# Patient Record
(demographics unavailable — no encounter records)

---

## 2025-01-27 NOTE — PHYSICAL EXAM
[FreeTextEntry1] : General: Constitutional:  Sitting comfortably in NAD. Psychiatric: well-groomed, appropriate affect, insight/judgment intact  Cognitive: Orientation, language, memory and knowledge screens intact. No expressive or receptive errors.  Cranial Nerves: VII: Face appears symmetric   Motor: Power: no pronator drift.

## 2025-01-27 NOTE — HISTORY OF PRESENT ILLNESS
[Home] : at home, [unfilled] , at the time of the visit. [Medical Office: ([facility] [place])___] : at the medical office located in  [Verbal consent obtained from patient] : the patient, [unfilled] [FreeTextEntry1] :  Trevon is a 48 yo with a question of ADD or ADHD.  He was encouraged by his wife, his therapist and through his reading to evaluate.  He is currently unemployed, reports having a bumpy life from age of 19.  He developed mental health issues at that time and has been facing depression since then. Heavy MJ user.  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  Has been trying to cut down on MJ and exercising more.  Grew up with mom, dad and younger brother 3-4 years younger in [place], and felt it was a nice upbringing. Attending [employer] [place], majored in theatre.  Graduated and felt he did well in that [place], but was smoking MJ daily. Had dreams of doing it professionally, but never executed on making an effort.   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 Felt that he hated work.  He executed on this visit - but found it agonizing to focus to get through the process.  It hangs over his head and can last for months.  Too many steps and seems complicated. Has trouble focusing on any particular task.  Finds himself incredibly inefficient. Procrastinates, disorganized and finds it exhausting and frustrating.  In grade school, he was one of the smarter kids in class. Moved to during 8th grade.  He was still achieving A's. High school his grades started to fall off.  Had trouble with completing homework - B's and C's in math and science, A's in humanities. Had started MJ in high school as sophomore or shabbir and was more regularly used as a senior.  Realizes his mood is better and he is more likely to eat healthy and exercise without it. He can skip for a few weeks, then may restart for a week or so.  His father may have similar traits of starting many projects and not finishing anything.

## 2025-01-27 NOTE — DISCUSSION/SUMMARY
[FreeTextEntry1] : Impression: 1) question of ADHD - fits the picture generally. 2) MJ overuse.  Plan: 1) send ASRS v1.1 2) will need in person visit with me or April 3) will speak to neuropsych about availability.

## 2025-02-19 NOTE — PHYSICAL EXAM
[FreeTextEntry1] : General: Constitutional: Sitting comfortably in NAD. Psychiatric: well-groomed, appropriate affect Ears, Nose, Throat: no abnormalities, mucus membranes moist Neck: supple Extremities: no edema, clubbing or cyanosis Skin: no rash or neuro-cutaneous signs  Cognitive: Orientation, language, memory and knowledge screens intact.  Cranial Nerves: II: BRENNEN. III/IV/VI: EOM Full. VII: Face appears symmetric. VIII: Normal to screening. IX/X: Normal phonation. XI: Trapezius Symmetric. XII: Tongue midline.  Motor: no tremor  Power: No pronator drift.  Normal gait.

## 2025-02-19 NOTE — HISTORY OF PRESENT ILLNESS
[FreeTextEntry1] : 2/19/25 HPI: Trevon is a 47 year old male presenting for a follow up visit for attention issues. Last seen by Dr. Layne via televisit.  Showed ASRS results - scored 5/6 on part A and 10/12 on part B.  Interested in starting treatment, and also formal neuropsych testing down the line.   History of HTN on Losartan. No palpitations.  --------------------------- Last seen 1/27/25: He is currently unemployed, reports having a bumpy life from age of 19. He developed mental health issues at that time and has been facing depression since then. Heavy MJ user. 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  Has been trying to cut down on MJ and exercising more.  Grew up with mom, dad and younger brother 3-4 years younger in [place], and felt it was a nice upbringing. Attending [employer], majored in theatre. Graduated and felt he did well in that [place], but was smoking MJ daily. Had dreams of doing it professionally, but never executed on making an effort. 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 Felt that he hated work.  He executed on this visit - but found it agonizing to focus to get through the process. It hangs over his head and can last for months. Too many steps and seems complicated. Has trouble focusing on any particular task. Finds himself incredibly inefficient. Procrastinates, disorganized and finds it exhausting and frustrating.  In grade school, he was one of the smarter kids in class. Moved to during 8th grade. He was still achieving A's. High school his grades started to fall off. Had trouble with completing homework - B's and C's in math and science, A's in humanities. Had started MJ in high school as sophomore or shabbir and was more regularly used as a senior.  Realizes his mood is better and he is more likely to eat healthy and exercise without it. He can skip for a few weeks, then may restart for a week or so.  His father may have similar traits of starting many projects and not finishing anything.

## 2025-02-19 NOTE — DISCUSSION/SUMMARY
[FreeTextEntry1] : Impression: 1) question of ADHD - fits the picture generally. scored 5/6 on ASRS part A, and 10/12 on part B 2) MJ overuse. 3) HTN - 124/89 in the office today, HR 91. Taking Losartan 50mg   Plan: 1) Trial of Atomoxetine 10-20mg. Advised to look out for increases in BP/HR 2) Follow up with neuropsych scheduling 3) Follow up in 4-6 weeks

## 2025-02-19 NOTE — HISTORY OF PRESENT ILLNESS
[FreeTextEntry1] : 2/19/25 HPI: Trevon is a 47 year old male presenting for a follow up visit for attention issues. Last seen by Dr. Layne via televisit.  Showed ASRS results - scored 5/6 on part A and 10/12 on part B.  Interested in starting treatment, and also formal neuropsych testing down the line.   History of HTN on Losartan. No palpitations.  --------------------------- Last seen 1/27/25: He is currently unemployed, reports having a bumpy life from age of 19. He developed mental health issues at that time and has been facing depression since then. Heavy MJ user. 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  Has been trying to cut down on MJ and exercising more.  Grew up with mom, dad and younger brother 3-4 years younger in [place], and felt it was a nice upbringing. Attending [employer], majored in theatre. Graduated and felt he did well in that [employer], but was smoking MJ daily. Had dreams of doing it professionally, but never executed on making an effort. 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 Felt that he hated work.  He executed on this visit - but found it agonizing to focus to get through the process. It hangs over his head and can last for months. Too many steps and seems complicated. Has trouble focusing on any particular task. Finds himself incredibly inefficient. Procrastinates, disorganized and finds it exhausting and frustrating.  In grade school, he was one of the smarter kids in class. Moved to during 8th grade. He was still achieving A's. High school his grades started to fall off. Had trouble with completing homework - B's and C's in math and science, A's in humanities. Had started MJ in high school as sophomore or shabbir and was more regularly used as a senior.  Realizes his mood is better and he is more likely to eat healthy and exercise without it. He can skip for a few weeks, then may restart for a week or so.  His father may have similar traits of starting many projects and not finishing anything.

## 2025-04-01 NOTE — REASON FOR VISIT
[Home] : at home, [unfilled] , at the time of the visit. [Medical Office: ([facility])___] : at the medical office located in  [Telehealth (audio & video)] : This visit was provided via telehealth using real-time 2-way audio visual technology. [Verbal consent obtained from patient] : the patient, [unfilled] [Follow-Up: _____] : a [unfilled] follow-up visit

## 2025-04-01 NOTE — PHYSICAL EXAM
[FreeTextEntry1] : General: Constitutional: Sitting comfortably in NAD. Psychiatric: well-groomed, appropriate affect  Cognitive: Orientation, language, memory and knowledge screens intact.  Cranial Nerves: II: BRENNEN. III/IV/VI: EOM Full. VII: Face appears symmetric. VIII: Normal to screening. IX/X: Normal phonation. XI: Trapezius Symmetric. XII: Tongue midline.

## 2025-04-01 NOTE — HISTORY OF PRESENT ILLNESS
[FreeTextEntry1] : 4/1/25 HPI: Trevon is a 47 year old male presenting for a follow up visit for attention issues.  Started Atomoxetine 10mg, increased to 20mg. On this dose for the past 5 weeks. When he first started, noticed insomnia, dry mouth, decreased libido, and difficulty urinating. One day he felt tremulous, had difficulty typing. Coincided with re-starting Ozempic. As the weeks progress, side effects improving.   Unsure if he has noticed any positive side effects. Notes prior to starting the med, he was doing better with his ADHD symptoms. Currently able to process and organize things better than he did in the past but unsure if it is from the med.   Neuropsych does not accept his insurance. ---------------------------- Last seen 2/19/25: Showed ASRS results - scored 5/6 on part A and 10/12 on part B. Interested in starting treatment, and also formal neuropsych testing down the line.  History of HTN on Losartan. No palpitations. --------------------------- Last seen 1/27/25: He is currently unemployed, reports having a bumpy life from age of 19. He developed mental health issues at that time and has been facing depression since then. Heavy MJ user. 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  Has been trying to cut down on MJ and exercising more.  Grew up with mom, dad and younger brother 3-4 years younger in [place], and felt it was a nice upbringing. Attending [employer], majored in theatre. Graduated and felt he did well in that [place], but was smoking MJ daily. Had dreams of doing it professionally, but never executed on making an effort. 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 Felt that he hated work.  He executed on this visit - but found it agonizing to focus to get through the process. It hangs over his head and can last for months. Too many steps and seems complicated. Has trouble focusing on any particular task. Finds himself incredibly inefficient. Procrastinates, disorganized and finds it exhausting and frustrating.  In grade school, he was one of the smarter kids in class. Moved to during 8th grade. He was still achieving A's. High school his grades started to fall off. Had trouble with completing homework - B's and C's in math and science, A's in humanities. Had started MJ in high school as sophomore or shabbir and was more regularly used as a senior.  Realizes his mood is better and he is more likely to eat healthy and exercise without it. He can skip for a few weeks, then may restart for a week or so.  His father may have similar traits of starting many projects and not finishing anything.

## 2025-04-01 NOTE — DISCUSSION/SUMMARY
[FreeTextEntry1] : Impression: 1) question of ADHD - fits the picture generally. scored 5/6 on ASRS part A, and 10/12 on part B 2) MJ overuse. 3) HTN - 124/89 in the office today, HR 91. Taking Losartan 50mg  Plan: 1) Continue Atomoxetine, will try higher dose at 36mg. Advised to keep track of side effects / positive effects 2) Follow up in 6 weeks

## 2025-05-12 NOTE — REASON FOR VISIT
[Home] : at home, [unfilled] , at the time of the visit. [Medical Office: ([facility])___] : at the medical office located in  [Telehealth (audio & video)] : This visit was provided via telehealth using real-time 2-way audio visual technology. [Verbal consent obtained from patient] : the patient, [unfilled] [Follow-Up: _____] : a [unfilled] follow-up visit

## 2025-05-13 NOTE — DISCUSSION/SUMMARY
[FreeTextEntry1] : Impression: 1) question of ADHD - fits the picture generally. scored 5/6 on ASRS part A, and 10/12 on part B 2) MJ overuse. 3) HTN - 124/89 in the office today, HR 91. Taking Losartan 50mg  Plan: 1)

## 2025-05-13 NOTE — HISTORY OF PRESENT ILLNESS
[FreeTextEntry1] : 5/13/25 HPI: Trevon is a 47 year old male presenting for a follow up visit for attention issues.  Increased Atomoxetine to 36mg     -------------------------- Last seen 4/1/25: Started Atomoxetine 10mg, increased to 20mg. On this dose for the past 5 weeks. When he first started, noticed insomnia, dry mouth, decreased libido, and difficulty urinating. One day he felt tremulous, had difficulty typing. Coincided with re-starting Ozempic. As the weeks progress, side effects improving.  Unsure if he has noticed any positive side effects. Notes prior to starting the med, he was doing better with his ADHD symptoms. Currently able to process and organize things better than he did in the past but unsure if it is from the med.  Neuropsych does not accept his insurance. ---------------------------- Last seen 2/19/25: Showed ASRS results - scored 5/6 on part A and 10/12 on part B. Interested in starting treatment, and also formal neuropsych testing down the line.  History of HTN on Losartan. No palpitations. --------------------------- Last seen 1/27/25: He is currently unemployed, reports having a bumpy life from age of 19. He developed mental health issues at that time and has been facing depression since then. Heavy MJ user. 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  Has been trying to cut down on MJ and exercising more.  Grew up with mom, dad and younger brother 3-4 years younger in [place], and felt it was a nice upbringing. Attending [employer], majored in theatre. Graduated and felt he did well in that [place], but was smoking MJ daily. Had dreams of doing it professionally, but never executed on making an effort. 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 Felt that he hated work.  He executed on this visit - but found it agonizing to focus to get through the process. It hangs over his head and can last for months. Too many steps and seems complicated. Has trouble focusing on any particular task. Finds himself incredibly inefficient. Procrastinates, disorganized and finds it exhausting and frustrating.  In grade school, he was one of the smarter kids in class. Moved to during 8th grade. He was still achieving A's. High school his grades started to fall off. Had trouble with completing homework - B's and C's in math and science, A's in humanities. Had started MJ in high school as sophomore or shabbir and was more regularly used as a senior.  Realizes his mood is better and he is more likely to eat healthy and exercise without it. He can skip for a few weeks, then may restart for a week or so.  His father may have similar traits of starting many projects and not finishing anything.

## 2025-05-22 NOTE — REASON FOR VISIT
[Home] : at home, [unfilled] , at the time of the visit. [Medical Office: ([facility])___] : at the medical office located in  [Telehealth (audio & video)] : This visit was provided via telehealth using real-time 2-way audio visual technology. [Verbal consent obtained from patient] : the patient, [unfilled] [Follow-Up: _____] : a [unfilled] follow-up visit

## 2025-05-22 NOTE — REASON FOR VISIT
[Home] : at home, [unfilled] , at the time of the visit. [Medical Office: ([facility])___] : at the medical office located in  [Telehealth (audio & video)] : This visit was provided via telehealth using real-time 2-way audio visual technology. [Verbal consent obtained from patient] : the patient, [unfilled] [Follow-Up: _____] : a [unfilled] follow-up visit

## 2025-05-27 NOTE — DISCUSSION/SUMMARY
[FreeTextEntry1] : Impression: 1) question of ADHD - fits the picture generally. scored 5/6 on ASRS part A, and 10/12 on part B. subtle improvements in productivity on Atomoxetine without negative side effects  2) MJ overuse. 3) HTN - 124/89 in the office today, HR 91. Taking Losartan 50mg  Plan: 1) Push Atomoxetine next to 50mg daily 2) Check in in 4-6 weeks

## 2025-05-27 NOTE — HISTORY OF PRESENT ILLNESS
[FreeTextEntry1] : 5/27/25 HPI: Trevon is a 47 year old male presenting for a follow up visit for attention issues.  Increased Atomoxetine to 36mg. Things have been good but still difficult to tell if it is doing much. Definitely feels more productive but hard to say. Prior side effects have completely resolved. Interested in pushing dose one more time to see if he notices any effect.  --------------------------- Last seen 4/1/25: Started Atomoxetine 10mg, increased to 20mg. On this dose for the past 5 weeks. When he first started, noticed insomnia, dry mouth, decreased libido, and difficulty urinating. One day he felt tremulous, had difficulty typing. Coincided with re-starting Ozempic. As the weeks progress, side effects improving.  Unsure if he has noticed any positive side effects. Notes prior to starting the med, he was doing better with his ADHD symptoms. Currently able to process and organize things better than he did in the past but unsure if it is from the med.  Neuropsych does not accept his insurance. ---------------------------- Last seen 2/19/25: Showed ASRS results - scored 5/6 on part A and 10/12 on part B. Interested in starting treatment, and also formal neuropsych testing down the line.  History of HTN on Losartan. No palpitations. --------------------------- Last seen 1/27/25: He is currently unemployed, reports having a bumpy life from age of 19. He developed mental health issues at that time and has been facing depression since then. Heavy MJ user. 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  Has been trying to cut down on MJ and exercising more.  Grew up with mom, dad and younger brother 3-4 years younger in [place], and felt it was a nice upbringing. Attending [employer], majored in theatre. Graduated and felt he did well in that [place], but was smoking MJ daily. Had dreams of doing it professionally, but never executed on making an effort. 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 Felt that he hated work.  He executed on this visit - but found it agonizing to focus to get through the process. It hangs over his head and can last for months. Too many steps and seems complicated. Has trouble focusing on any particular task. Finds himself incredibly inefficient. Procrastinates, disorganized and finds it exhausting and frustrating.  In grade school, he was one of the smarter kids in class. Moved to during 8th grade. He was still achieving A's. High school his grades started to fall off. Had trouble with completing homework - B's and C's in math and science, A's in humanities. Had started MJ in high school as sophomore or shabbir and was more regularly used as a senior.  Realizes his mood is better and he is more likely to eat healthy and exercise without it. He can skip for a few weeks, then may restart for a week or so.  His father may have similar traits of starting many projects and not finishing anything.

## 2025-05-27 NOTE — HISTORY OF PRESENT ILLNESS
[FreeTextEntry1] : 5/27/25 HPI: Trevon is a 47 year old male presenting for a follow up visit for attention issues.  Increased Atomoxetine to 36mg. Things have been good but still difficult to tell if it is doing much. Definitely feels more productive but hard to say. Prior side effects have completely resolved. Interested in pushing dose one more time to see if he notices any effect.  --------------------------- Last seen 4/1/25: Started Atomoxetine 10mg, increased to 20mg. On this dose for the past 5 weeks. When he first started, noticed insomnia, dry mouth, decreased libido, and difficulty urinating. One day he felt tremulous, had difficulty typing. Coincided with re-starting Ozempic. As the weeks progress, side effects improving.  Unsure if he has noticed any positive side effects. Notes prior to starting the med, he was doing better with his ADHD symptoms. Currently able to process and organize things better than he did in the past but unsure if it is from the med.  Neuropsych does not accept his insurance. ---------------------------- Last seen 2/19/25: Showed ASRS results - scored 5/6 on part A and 10/12 on part B. Interested in starting treatment, and also formal neuropsych testing down the line.  History of HTN on Losartan. No palpitations. --------------------------- Last seen 1/27/25: He is currently unemployed, reports having a bumpy life from age of 19. He developed mental health issues at that time and has been facing depression since then. Heavy MJ user. 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  Has been trying to cut down on MJ and exercising more.  Grew up with mom, dad and younger brother 3-4 years younger in [place], and felt it was a nice upbringing. Attending [employer], majored in theatre. Graduated and felt he did well in that [employer], but was smoking MJ daily. Had dreams of doing it professionally, but never executed on making an effort. 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 Felt that he hated work.  He executed on this visit - but found it agonizing to focus to get through the process. It hangs over his head and can last for months. Too many steps and seems complicated. Has trouble focusing on any particular task. Finds himself incredibly inefficient. Procrastinates, disorganized and finds it exhausting and frustrating.  In grade school, he was one of the smarter kids in class. Moved to during 8th grade. He was still achieving A's. High school his grades started to fall off. Had trouble with completing homework - B's and C's in math and science, A's in humanities. Had started MJ in high school as sophomore or shabbir and was more regularly used as a senior.  Realizes his mood is better and he is more likely to eat healthy and exercise without it. He can skip for a few weeks, then may restart for a week or so.  His father may have similar traits of starting many projects and not finishing anything.

## 2025-07-07 NOTE — HISTORY OF PRESENT ILLNESS
[FreeTextEntry1] :  Trevon is a 46 yo with a question of ADD or ADHD.  He was encouraged by his wife, his therapist and through his reading to evaluate.  Currently at 50mg atomoxetine.  He himself has not noticed any difference recently, after feeling optimistic at the beginning. People who know him have not commented any difference. But has not been tested much, because has not been working much, and has no children etc.  He has still been smoking too much MJ at times, then will stop and then restart, which makes it more difficult to assess. He does not have any particular reason for using MJ a lot - and taking weeks off is an improvement.  He feels sobriety is preferred - but still is at 50% of time.  He will drift back into it and have a hard time getting back out. He has goals to work more and do more creative work and to do more exercise. He has put together a resume and has dropped by one restaurant to talk to them.  Currently unemployed, reports having a bumpy life from age of 19.   He developed mental health issues at that time and has been facing depression since then. Heavy MJ user.  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  Grew up with mom, dad and younger brother 3-4 years younger in [place], and felt it was a nice upbringing. Attending [employer], majored in theatre.  Graduated and felt he did well in that [place], but was smoking MJ daily. Had dreams of doing it professionally, but never executed on making an effort.   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 Felt that he hated work.  He executed on this visit - but found it agonizing to focus to get through the process.  It hangs over his head and can last for months.  Too many steps and seems complicated. Has trouble focusing on any particular task.  Finds himself incredibly inefficient. Procrastinates, disorganized and finds it exhausting and frustrating.  In grade school, he was one of the smarter kids in class. Moved to during 8th grade.  He was still achieving A's. High school his grades started to fall off.  Had trouble with completing homework - B's and C's in math and science, A's in humanities. Had started MJ in high school as sophomore or shabbir and was more regularly used as a senior.  Realizes his mood is better and he is more likely to eat healthy and exercise without it. He can skip for a few weeks, then may restart for a week or so.  His father may have similar traits of starting many projects and not finishing anything.

## 2025-07-07 NOTE — PHYSICAL EXAM
[FreeTextEntry1] : General: Constitutional:  Sitting comfortably in NAD. Psychiatric: well-groomed, appropriate affect Ears, Nose, Throat: no abnormalities, mucus membranes moist Neck: supple Extremities: no edema, clubbing or cyanosis Skin: no rash or neuro-cutaneous signs   Cognitive: Orientation, language, memory and knowledge screens intact.  Cranial Nerves: II: BRENNEN. III/IV/VI: EOM Full.  VII: Face appears symmetric VIII: Normal to screening IX/X: normal phonation  XI: Trapezius Symmetric  XII: Tongue midline Motor: Power: no pronator drift  Narrow based gait

## 2025-07-07 NOTE — DISCUSSION/SUMMARY
[FreeTextEntry1] : Impression: 1) attention deficits, having trouble planing/executing/prioritizing to get life on track.  He is very motivated, has not missed appointments (was early today) and having the forethought to ask how referrals and follow-ups should be made. 2) MJ use - chronic, but cutting down on his own and spends significant time without any, and feels better sober.  He tends to reuse more due to boredom, and being busy he feels will be helpful.  Plan: 1) referral to to Dr. Schoen, for course of CBT and help set-up structure 2) increase atomoxetine to 60mg 3) has f/u with April and will ask Jeni to make a f/u with me, Dec/Jan in case we need to make big changes in his plan.

## 2025-07-07 NOTE — REASON FOR VISIT
[Other Location: e.g. School (Enter Location, City,State)___] : at [unfilled], at the time of the visit. [Medical Office: ([facility])___] : at the medical office located in  [Telehealth (audio & video)] : This visit was provided via telehealth using real-time 2-way audio visual technology. [Follow-Up: _____] : a [unfilled] follow-up visit